# Patient Record
Sex: MALE | Race: WHITE | NOT HISPANIC OR LATINO
[De-identification: names, ages, dates, MRNs, and addresses within clinical notes are randomized per-mention and may not be internally consistent; named-entity substitution may affect disease eponyms.]

---

## 2017-06-26 ENCOUNTER — APPOINTMENT (OUTPATIENT)
Dept: RADIOLOGY | Facility: CLINIC | Age: 22
End: 2017-06-26

## 2017-06-26 ENCOUNTER — OUTPATIENT (OUTPATIENT)
Dept: OUTPATIENT SERVICES | Facility: HOSPITAL | Age: 22
LOS: 1 days | End: 2017-06-26
Payer: MEDICAID

## 2017-06-26 DIAGNOSIS — B35.0 TINEA BARBAE AND TINEA CAPITIS: ICD-10-CM

## 2017-06-26 PROBLEM — Z00.00 ENCOUNTER FOR PREVENTIVE HEALTH EXAMINATION: Status: ACTIVE | Noted: 2017-06-26

## 2017-06-26 PROCEDURE — 73080 X-RAY EXAM OF ELBOW: CPT

## 2017-06-26 PROCEDURE — 73110 X-RAY EXAM OF WRIST: CPT

## 2017-06-26 PROCEDURE — 73090 X-RAY EXAM OF FOREARM: CPT

## 2017-07-13 ENCOUNTER — APPOINTMENT (OUTPATIENT)
Dept: OPHTHALMOLOGY | Facility: CLINIC | Age: 22
End: 2017-07-13

## 2017-07-14 ENCOUNTER — APPOINTMENT (OUTPATIENT)
Dept: OPHTHALMOLOGY | Facility: CLINIC | Age: 22
End: 2017-07-14

## 2019-05-22 ENCOUNTER — TRANSCRIPTION ENCOUNTER (OUTPATIENT)
Age: 24
End: 2019-05-22

## 2019-10-23 ENCOUNTER — TRANSCRIPTION ENCOUNTER (OUTPATIENT)
Age: 24
End: 2019-10-23

## 2022-09-03 ENCOUNTER — TRANSCRIPTION ENCOUNTER (OUTPATIENT)
Age: 27
End: 2022-09-03

## 2022-09-04 ENCOUNTER — EMERGENCY (EMERGENCY)
Facility: HOSPITAL | Age: 27
LOS: 1 days | Discharge: HOME | End: 2022-09-04
Attending: EMERGENCY MEDICINE
Payer: SELF-PAY

## 2022-09-04 VITALS
SYSTOLIC BLOOD PRESSURE: 118 MMHG | RESPIRATION RATE: 20 BRPM | TEMPERATURE: 98 F | HEART RATE: 75 BPM | DIASTOLIC BLOOD PRESSURE: 73 MMHG | HEIGHT: 68 IN | OXYGEN SATURATION: 97 % | WEIGHT: 195.11 LBS

## 2022-09-04 VITALS
DIASTOLIC BLOOD PRESSURE: 75 MMHG | RESPIRATION RATE: 18 BRPM | OXYGEN SATURATION: 98 % | SYSTOLIC BLOOD PRESSURE: 125 MMHG | HEART RATE: 72 BPM

## 2022-09-04 PROCEDURE — 70486 CT MAXILLOFACIAL W/O DYE: CPT | Mod: MA

## 2022-09-04 PROCEDURE — 99284 EMERGENCY DEPT VISIT MOD MDM: CPT | Mod: 25

## 2022-09-04 PROCEDURE — 76377 3D RENDER W/INTRP POSTPROCES: CPT | Mod: 26

## 2022-09-04 PROCEDURE — 99285 EMERGENCY DEPT VISIT HI MDM: CPT

## 2022-09-04 PROCEDURE — 76377 3D RENDER W/INTRP POSTPROCES: CPT

## 2022-09-04 PROCEDURE — 70486 CT MAXILLOFACIAL W/O DYE: CPT | Mod: 26,MA

## 2022-09-04 RX ADMIN — Medication 300 MILLIGRAM(S): at 14:00

## 2022-09-04 NOTE — ED PROVIDER NOTE - PHYSICAL EXAMINATION
NAD. VSS. Afebrile. +PERRL, EOMI. Vision Acuity 20/25 bilaterally with correction. +Localized small sized ecchymosis on left nasojugal region with tender. Left sided nasal tender. No epistaxis and septal hematoma. Neck supple. Lungs clear. Neuro- intact.

## 2022-09-04 NOTE — ED ADULT NURSE NOTE - OBJECTIVE STATEMENT
received pt in room 32 c/o nose injury. pt was hit with basball  yesterday. pt -loc pt was sent in and seen by DR Esau spears applied pt med as orderd.

## 2022-09-04 NOTE — ED PROVIDER NOTE - OBJECTIVE STATEMENT
28yo male pt with PMHx of Nasal Fracture (10years ago, closed reduction and splint in ED) presents to ED with nasal injury yesterday. Reports he was playing baseball and a ball hit his nose. Denies LOC. He spoke to plastic surgeon Dr. Castaneda and was sent to ED. Denies eye pain, visual changes or N/V. Denies dizziness. Denies other injuries. Denies neck or back pain. Denies sensory changes or weakness to extremities. Denies fever, chills, cough or recent sickness.

## 2022-09-04 NOTE — ED PROVIDER NOTE - ATTENDING APP SHARED VISIT CONTRIBUTION OF CARE
Attending MD Harris: I personally have seen and examined this patient.  NP note reviewed and agree on plan of care and except where noted.  See below for details.     Seen in [facility], accompanied by mother    27M with PMH/PSH including previous nasal fracture (10 yrs ago) presents to the ED with nasal injury.  Reports he was hit with a baseball on the nose.  Reports was sent to the Emergency Department by Dr. Reid.  Denies change in vision, double vision, sudden loss of vision. Denies severe headache. Indicates pain at nasal bridge.  Denies nausea, vomiting.  Denies LOC, dizziness.  Denies neck pain.  Denies numbness, weakness or tingling in extremities.     Exam:   General: NAD  HEENT: head NCAT, airway patent, +tenderness to bony bridge of nose and at medial superior orbital rim on L, mild infraorbital ecchymosis on L, PERRL, EOMI, no nasal septal hematoma  Chest: symmetric chest rise, no increased work of breathing  MSK: ranging neck freely  Neuro: moving all extremities spontaneously, sensory grossly intact, no gross neuro deficits  Psych: normal mood and affect     A/P: 27M with suspected nasal bone fx, will obtain CT MF, plastics awaiting to eval patient

## 2022-09-04 NOTE — ED PROVIDER NOTE - PATIENT PORTAL LINK FT
You can access the FollowMyHealth Patient Portal offered by [facility] by registering at the following website: [email or web address]. By joining [facility]’s FollowMyHealth portal, you will also be able to view your health information using other applications (apps) compatible with our system.

## 2022-09-04 NOTE — ED PROVIDER NOTE - PROGRESS NOTE DETAILS
Pt came back from CT and awaiting for reading. Dr. Gonsalves at bedside. Nasal splint applied by Dr. Gonsalves and recommended Clindamycin for 5days and outpt f/u. Attending MD Harris: Patient seen and splinted by Dr. Reid, first dose Clinda given, will Rx for 5 days as per recs.  CT MaxFace reviewed.  Patient will follow up with Dr. Reid on Friday.  Stable for discharge.  Follow up instructions given, importance of follow up emphasized, return to ED parameters reviewed and patient verbalized understanding.  All questions answered, all concerns addressed.

## 2022-09-04 NOTE — ED PROVIDER NOTE - NSFOLLOWUPINSTRUCTIONS_ED_ALL_ED_FT
Please see the information of Nasal Fracture.    No contact sports as instructed by Dr. Castaneda.    Avoid blowing your nose.    Ice to splint area for 2days; every 2hours for 20minutes.    Take Ibuprofen or Tylenol for pain as needed.    Clindamycin as prescribed for 5days.     Follow up with Dr. Castaneda as scheduled, call Tuesday for appointment.    Return for any concerns, nasal bleeding, fever, or worsening pain.                                                             Nasal Fracture    A nasal fracture is a break or crack in the bones of the nose or the tissue that helps to form the nose (cartilage). Minor breaks do not require treatment and usually heal on their own after about one month. Serious breaks may require treatment that could include surgery.    What are the causes?    A nasal fracture is usually caused by the strong force of a direct hit to the nose (blunt injury). This type of injury often occurs from:  •Playing a contact sport.  •Being involved in a car accident.  •Falling.    What are the signs or symptoms?    Symptoms of this condition include:  •Pain.  •Swelling of the nose.  •Bleeding from the nose.  •Bruising around the nose or bruising around the eyes (black eyes).  •Crooked appearance of the nose.    How is this diagnosed?    This condition may be diagnosed based on a physical exam. During the exam, the health care provider will:  •Gently feel the nose for signs of broken bones.  •Look inside the nostrils to check if there is a blood-filled swelling on the dividing wall between the nostrils (septal hematoma).    An X-ray of the nose may be taken. Sometimes, an X-ray may not show a nasal fracture even when one is present. In some cases, X-rays or a CT scan may be taken again 1–5 days later after the swelling has gone down.    How is this treated?    Treatment for this condition depends on the severity of the injury.  •Minor fractures that have not caused deformity often do not require treatment.  •For more serious fractures that have caused bones to move out of position, treatment may involve one of the following:  •Repositioning the bones without surgery. The health care provider may be able to do this in his or her office after you are given medicine to numb the nasal area (local anesthetic).  •Surgery. If surgery is needed, it will be done after the swelling is gone. Surgery will stabilize and align the fracture.    Follow these instructions at home:    Activity   •Return to your normal activities as told by your health care provider. Ask your health care provider what activities are safe for you.  •Avoid contact sports for 3–4 weeks or as told by your health care provider.    General instructions   •If directed, put ice on the injured area:  •Put ice in a plastic bag.  •Place a towel between your skin and the bag.  •Leave the ice on for 20 minutes, 2–3 times a day.  •Take over-the-counter and prescription medicines only as told by your health care provider.  •If your nose starts to bleed, sit in an upright position while you squeeze the soft parts of your nose against the dividing wall between your nostrils (septum) for 10 minutes.  •Try to avoid blowing your nose.  •Keep all follow-up visits as told by your health care provider. This is important.    Contact a health care provider if:  •Your pain increases or becomes severe.  •You continue to have nosebleeds.  •The shape of your nose does not return to normal within 5 days.  •You have pus draining out of your nose.    Get help right away if:  •You have bleeding from your nose that does not stop after you pinch your nostrils closed for 20 minutes and keep ice on your nose.  •You have clear fluid draining out of your nose.  •You notice swelling near the septum inside the nose. This swelling is a septal hematoma that must be drained to help prevent infection.  •You have difficulty moving your eyes.  •You have repeated vomiting.    Summary  •A nasal fracture is a break or crack in the bones or cartilage of the nose.  •The fracture is usually caused by a blunt injury to the nose.  •Symptoms include pain, swelling, and facial bruising.  •Nasal fractures may heal on their own, or your health care provider may need to move the bones back into proper position. In some cases, surgery may be needed. Please see the information of Nasal Fracture.    No contact sports as instructed by Dr. Reid.    Avoid blowing your nose.    Ice to splint area for 2days; every 2hours for 20minutes.    Take Ibuprofen or Tylenol for pain as needed.    Clindamycin as prescribed for 5 days.     Follow up with Dr. Reid on Friday at [facility], call Tuesday for appointment.    Return for any concerns, nasal bleeding, fever, or worsening pain.                                                             Nasal Fracture    A nasal fracture is a break or crack in the bones of the nose or the tissue that helps to form the nose (cartilage). Minor breaks do not require treatment and usually heal on their own after about one month. Serious breaks may require treatment that could include surgery.    What are the causes?    A nasal fracture is usually caused by the strong force of a direct hit to the nose (blunt injury). This type of injury often occurs from:  •Playing a contact sport.  •Being involved in a car accident.  •Falling.    What are the signs or symptoms?    Symptoms of this condition include:  •Pain.  •Swelling of the nose.  •Bleeding from the nose.  •Bruising around the nose or bruising around the eyes (black eyes).  •Crooked appearance of the nose.    How is this diagnosed?    This condition may be diagnosed based on a physical exam. During the exam, the health care provider will:  •Gently feel the nose for signs of broken bones.  •Look inside the nostrils to check if there is a blood-filled swelling on the dividing wall between the nostrils (septal hematoma).    An X-ray of the nose may be taken. Sometimes, an X-ray may not show a nasal fracture even when one is present. In some cases, X-rays or a CT scan may be taken again 1–5 days later after the swelling has gone down.    How is this treated?    Treatment for this condition depends on the severity of the injury.  •Minor fractures that have not caused deformity often do not require treatment.  •For more serious fractures that have caused bones to move out of position, treatment may involve one of the following:  •Repositioning the bones without surgery. The health care provider may be able to do this in his or her office after you are given medicine to numb the nasal area (local anesthetic).  •Surgery. If surgery is needed, it will be done after the swelling is gone. Surgery will stabilize and align the fracture.    Follow these instructions at home:    Activity   •Return to your normal activities as told by your health care provider. Ask your health care provider what activities are safe for you.  •Avoid contact sports for 3–4 weeks or as told by your health care provider.    General instructions   •If directed, put ice on the injured area:  •Put ice in a plastic bag.  •Place a towel between your skin and the bag.  •Leave the ice on for 20 minutes, 2–3 times a day.  •Take over-the-counter and prescription medicines only as told by your health care provider.  •If your nose starts to bleed, sit in an upright position while you squeeze the soft parts of your nose against the dividing wall between your nostrils (septum) for 10 minutes.  •Try to avoid blowing your nose.  •Keep all follow-up visits as told by your health care provider. This is important.    Contact a health care provider if:  •Your pain increases or becomes severe.  •You continue to have nosebleeds.  •The shape of your nose does not return to normal within 5 days.  •You have pus draining out of your nose.    Get help right away if:  •You have bleeding from your nose that does not stop after you pinch your nostrils closed for 20 minutes and keep ice on your nose.  •You have clear fluid draining out of your nose.  •You notice swelling near the septum inside the nose. This swelling is a septal hematoma that must be drained to help prevent infection.  •You have difficulty moving your eyes.  •You have repeated vomiting.    Summary  •A nasal fracture is a break or crack in the bones or cartilage of the nose.  •The fracture is usually caused by a blunt injury to the nose.  •Symptoms include pain, swelling, and facial bruising.  •Nasal fractures may heal on their own, or your health care provider may need to move the bones back into proper position. In some cases, surgery may be needed.

## 2022-09-04 NOTE — ED PROVIDER NOTE - NS ED ATTENDING STATEMENT MOD
This was a shared visit with the MATHEW. I reviewed and verified the documentation and independently performed the documented:

## 2022-09-04 NOTE — ED PROVIDER NOTE - CARE PROVIDER_API CALL
Mark Reid)  Plastic Surgery  [address] [place] [address], [address] [place] [id number]  Phone: [phone]  Fax: [phone]  Follow Up Time: 1-3 Days

## 2024-01-19 ENCOUNTER — NON-APPOINTMENT (OUTPATIENT)
Age: 29
End: 2024-01-19

## 2024-04-26 NOTE — ED ADULT TRIAGE NOTE - NS ED TRIAGE AVPU SCALE
General: NAD, lying in bed comfortably  Head: NCAT  Neck: Supple, no JVD  Cardiac: Regular rate and rhythm  Pulmonary: CTAB  Abdominal: Soft, nontender, and nondistended  Extremities: Trace edema b/l ankles  Neurologic: AOx3, nonfocal  Skin: No rashes or lesions
Alert-The patient is alert, awake and responds to voice. The patient is oriented to time, place, and person. The triage nurse is able to obtain subjective information.

## 2025-09-15 ENCOUNTER — NON-APPOINTMENT (OUTPATIENT)
Age: 30
End: 2025-09-15

## 2025-09-17 ENCOUNTER — APPOINTMENT (OUTPATIENT)
Dept: ORTHOPEDIC SURGERY | Facility: CLINIC | Age: 30
End: 2025-09-17
Payer: MEDICAID

## 2025-09-17 VITALS
BODY MASS INDEX: 32.58 KG/M2 | DIASTOLIC BLOOD PRESSURE: 80 MMHG | HEART RATE: 75 BPM | HEIGHT: 68 IN | SYSTOLIC BLOOD PRESSURE: 125 MMHG | WEIGHT: 215 LBS

## 2025-09-17 DIAGNOSIS — M72.2 PLANTAR FASCIAL FIBROMATOSIS: ICD-10-CM

## 2025-09-17 DIAGNOSIS — S93.699A OTHER SPRAIN OF UNSPECIFIED FOOT, INITIAL ENCOUNTER: ICD-10-CM

## 2025-09-17 PROCEDURE — 99203 OFFICE O/P NEW LOW 30 MIN: CPT

## 2025-09-17 RX ORDER — IBUPROFEN 800 MG/1
800 TABLET, FILM COATED ORAL 3 TIMES DAILY
Qty: 90 | Refills: 0 | Status: ACTIVE | COMMUNITY
Start: 2025-09-17 | End: 1900-01-01